# Patient Record
Sex: FEMALE | Race: WHITE | Employment: OTHER | URBAN - METROPOLITAN AREA
[De-identification: names, ages, dates, MRNs, and addresses within clinical notes are randomized per-mention and may not be internally consistent; named-entity substitution may affect disease eponyms.]

---

## 2022-01-15 ENCOUNTER — HOSPITAL ENCOUNTER (EMERGENCY)
Age: 52
Discharge: HOME | End: 2022-01-15
Payer: MEDICARE

## 2022-01-15 VITALS
OXYGEN SATURATION: 96 % | HEART RATE: 95 BPM | DIASTOLIC BLOOD PRESSURE: 81 MMHG | WEIGHT: 140 LBS | RESPIRATION RATE: 16 BRPM | BODY MASS INDEX: 25.61 KG/M2 | SYSTOLIC BLOOD PRESSURE: 148 MMHG | TEMPERATURE: 97.7 F

## 2022-01-15 DIAGNOSIS — N39.0 URINARY TRACT INFECTION WITH HEMATURIA, SITE UNSPECIFIED: Primary | ICD-10-CM

## 2022-01-15 DIAGNOSIS — R31.9 URINARY TRACT INFECTION WITH HEMATURIA, SITE UNSPECIFIED: Primary | ICD-10-CM

## 2022-01-15 LAB
BACTERIA: ABNORMAL /HPF
BILIRUBIN URINE: NEGATIVE
BLOOD, URINE: ABNORMAL
CLARITY: ABNORMAL
COLOR: YELLOW
GLUCOSE URINE: NEGATIVE MG/DL
KETONES, URINE: NEGATIVE MG/DL
LEUKOCYTE ESTERASE, URINE: ABNORMAL
NITRITE, URINE: NEGATIVE
PH UA: 6 (ref 5–9)
PROTEIN UA: 30 MG/DL
RBC UA: >20 /HPF (ref 0–2)
SPECIFIC GRAVITY UA: 1.02 (ref 1–1.03)
UROBILINOGEN, URINE: 0.2 E.U./DL
WBC UA: >20 /HPF (ref 0–5)

## 2022-01-15 PROCEDURE — 81001 URINALYSIS AUTO W/SCOPE: CPT

## 2022-01-15 PROCEDURE — 87088 URINE BACTERIA CULTURE: CPT

## 2022-01-15 PROCEDURE — 87077 CULTURE AEROBIC IDENTIFY: CPT

## 2022-01-15 PROCEDURE — 99211 OFF/OP EST MAY X REQ PHY/QHP: CPT

## 2022-01-15 PROCEDURE — 87186 SC STD MICRODIL/AGAR DIL: CPT

## 2022-01-15 RX ORDER — HYDROCODONE BITARTRATE AND ACETAMINOPHEN 10; 325 MG/1; MG/1
1 TABLET ORAL EVERY 6 HOURS PRN
COMMUNITY

## 2022-01-15 RX ORDER — CEPHALEXIN 500 MG/1
500 CAPSULE ORAL 3 TIMES DAILY
Qty: 21 CAPSULE | Refills: 0 | Status: SHIPPED | OUTPATIENT
Start: 2022-01-15 | End: 2022-01-22

## 2022-01-15 NOTE — ED PROVIDER NOTES
Supple. Respiratory:  Lungs Clear to auscultation and breath sounds equal.  CV:  Regular rate and rhythm, normal heart sounds, without pathological murmurs, ectopy, gallops, or rubs. GI: No tenderness    Back: CVA Tenderness: No CVA tenderness. Integument: .  Warm, dry, without visible rash, unless noted elsewhere. Lymphatics: No lymphangitis or adenopathy noted. Neurological:  Oriented. Motor functions intact. Lab / Imaging Results   (All laboratory and radiology results have been personally reviewed by myself)  Labs:  Results for orders placed or performed during the hospital encounter of 01/15/22   Urinalysis   Result Value Ref Range    Color, UA Yellow Straw/Yellow    Clarity, UA CLOUDY (A) Clear    Glucose, Ur Negative Negative mg/dL    Bilirubin Urine Negative Negative    Ketones, Urine Negative Negative mg/dL    Specific Gravity, UA 1.025 1.005 - 1.030    Blood, Urine LARGE (A) Negative    pH, UA 6.0 5.0 - 9.0    Protein, UA 30 (A) Negative mg/dL    Urobilinogen, Urine 0.2 <2.0 E.U./dL    Nitrite, Urine Negative Negative    Leukocyte Esterase, Urine MODERATE (A) Negative   Microscopic Urinalysis   Result Value Ref Range    WBC, UA >20 (A) 0 - 5 /HPF    RBC, UA >20 0 - 2 /HPF    Bacteria, UA MODERATE (A) None Seen /HPF       Imaging: All Radiology results interpreted by Radiologist unless otherwise noted. No orders to display     ED Course / Medical Decision Making   Medications - No data to display       Consults:   None    Medical Decision Making:    Patient is well appearing, non toxic and appropriate for outpatient management. Plan is for symptom management and PCP follow up. She is positive for UTI she does have microscopic blood in her urine. I did start her on Keflex. Advised her to drink plenty of fluids. Advised her to follow-up with her doctor when she is treated to be sure the microscopic blood is cleared if not she will need further testing        Assessment      1.  Urinary tract infection with hematuria, site unspecified      Plan   Disposition: Discharge to home and advised to contact Char Plascencia MD  [address]  [phone]    Schedule an appointment as soon as possible for a visit      Patient condition is good    New Medications     New Prescriptions    CEPHALEXIN (KEFLEX) 500 MG CAPSULE    Take 1 capsule by mouth 3 times daily for 7 days     Electronically signed by MICHELLE Saldana CNP   DD: 1/15/22  **This report was transcribed using voice recognition software. Every effort was made to ensure accuracy; however, inadvertent computerized transcription errors may be present.   END OF ED PROVIDER NOTE           MICHELLE Saldana CNP  01/15/22 [id number]

## 2022-01-18 LAB
ORGANISM: ABNORMAL
URINE CULTURE, ROUTINE: ABNORMAL

## 2025-04-02 ENCOUNTER — TRANSCRIBE ORDERS (OUTPATIENT)
Dept: ADMINISTRATIVE | Age: 55
End: 2025-04-02

## 2025-04-02 DIAGNOSIS — M25.531 WRIST PAIN, ACUTE, RIGHT: Primary | ICD-10-CM

## 2025-04-24 ENCOUNTER — HOSPITAL ENCOUNTER (OUTPATIENT)
Dept: MRI IMAGING | Age: 55
Discharge: HOME | End: 2025-04-26
Attending: FAMILY MEDICINE
Payer: MEDICARE

## 2025-04-24 DIAGNOSIS — M25.531 WRIST PAIN, ACUTE, RIGHT: ICD-10-CM

## 2025-04-24 PROCEDURE — 70553 MRI BRAIN STEM W/O & W/DYE: CPT

## 2025-04-24 PROCEDURE — 6360000004 HC RX CONTRAST MEDICATION: Performed by: RADIOLOGY

## 2025-04-24 PROCEDURE — A9577 INJ MULTIHANCE: HCPCS | Performed by: RADIOLOGY

## 2025-04-24 PROCEDURE — 72156 MRI NECK SPINE W/O & W/DYE: CPT

## 2025-04-24 RX ADMIN — GADOBENATE DIMEGLUMINE 12 ML: 529 INJECTION, SOLUTION INTRAVENOUS at 19:10

## 2025-04-25 ENCOUNTER — OFFICE VISIT (OUTPATIENT)
Dept: NEUROSURGERY | Age: 55
End: 2025-04-25
Payer: MEDICARE

## 2025-04-25 VITALS
WEIGHT: 130 LBS | DIASTOLIC BLOOD PRESSURE: 84 MMHG | HEART RATE: 80 BPM | HEIGHT: 62 IN | RESPIRATION RATE: 18 BRPM | SYSTOLIC BLOOD PRESSURE: 146 MMHG | OXYGEN SATURATION: 92 % | BODY MASS INDEX: 23.92 KG/M2

## 2025-04-25 DIAGNOSIS — M43.8X2 ROTATIONAL DEFORMITY OF CERVICAL SPINE: Primary | ICD-10-CM

## 2025-04-25 PROCEDURE — 99204 OFFICE O/P NEW MOD 45 MIN: CPT | Performed by: NEUROLOGICAL SURGERY

## 2025-04-25 PROCEDURE — G8427 DOCREV CUR MEDS BY ELIG CLIN: HCPCS | Performed by: NEUROLOGICAL SURGERY

## 2025-04-25 PROCEDURE — 4004F PT TOBACCO SCREEN RCVD TLK: CPT | Performed by: NEUROLOGICAL SURGERY

## 2025-04-25 PROCEDURE — 3017F COLORECTAL CA SCREEN DOC REV: CPT | Performed by: NEUROLOGICAL SURGERY

## 2025-04-25 PROCEDURE — G8420 CALC BMI NORM PARAMETERS: HCPCS | Performed by: NEUROLOGICAL SURGERY

## 2025-04-25 RX ORDER — CYCLOBENZAPRINE HCL 10 MG
TABLET ORAL
COMMUNITY

## 2025-04-25 RX ORDER — HYDROXYZINE HYDROCHLORIDE 10 MG/1
10 TABLET, FILM COATED ORAL NIGHTLY
COMMUNITY
Start: 2025-04-08

## 2025-04-25 RX ORDER — ACETAMINOPHEN AND CODEINE PHOSPHATE 300; 60 MG/1; MG/1
TABLET ORAL
COMMUNITY

## 2025-04-25 RX ORDER — GABAPENTIN 300 MG/1
300 CAPSULE ORAL 3 TIMES DAILY
COMMUNITY
Start: 2025-03-19

## 2025-04-25 ASSESSMENT — ENCOUNTER SYMPTOMS
TROUBLE SWALLOWING: 0
SHORTNESS OF BREATH: 0
ABDOMINAL PAIN: 0
PHOTOPHOBIA: 0

## 2025-04-25 NOTE — PROGRESS NOTES
right wrist drop but is improving with conservative treatment.  On today's examination patient's right wrist flexion is 3 out of 5, finger extension 3- out of 5.  Patient has good has good strength in bilateral deltoid, biceps, triceps muscles.  She is hyperreflexic.  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  Surgically patient will require front back reconstruction anteriorly C4-7, posteriorly C3-T2 for deformity correction and decompression of nerve roots.  The patient also is a smoker.  Patient informs me that at this point she is not ready for any surgical intervention, she would like to continue with therapy, I recommended linear traction.  I recommend smoking cessation.  Patient follow-up with me is as necessary.        Electronically signed by Nick Copeland MD on 4/25/2025 at 1:57 PM

## 2025-04-30 NOTE — Clinical Note
Olga Serrano                                                                [address] [facility] [address]         4/30/25     Dear Ms. Serrano:  MRN:[id number]    This message is regarding a referral that needs to be scheduled. We were unable to reach you by phone; however, your referral is available for review in [facility] under insurance in the drop down menu. We will be making further attempts to contact you to get this scheduled.       Thank you,  Deshaun [facility]

## 2025-05-02 DIAGNOSIS — M21.331 RIGHT WRIST DROP: Primary | ICD-10-CM

## 2025-05-14 ENCOUNTER — PROCEDURE VISIT (OUTPATIENT)
Dept: PHYSICAL MEDICINE AND REHAB | Age: 55
End: 2025-05-14

## 2025-05-14 VITALS — BODY MASS INDEX: 23.92 KG/M2 | HEIGHT: 62 IN | WEIGHT: 130 LBS

## 2025-05-14 DIAGNOSIS — M21.331 RIGHT WRIST DROP: Primary | ICD-10-CM

## 2025-05-14 NOTE — PROGRESS NOTES
[facility]  Electrodiagnostic Laboratory  *Accredited by the [facility] with exemplary status  [address] [place] [address]  Phone: [phone]  Fax: [phone]    Referring Provider: Mora Landry MD  Primary Care Physician: Mora Landry MD  Patient Name: Olga Serrano  Patient YOB: 1970  Gender: female  BMI: Body mass index is 23.78 kg/m².  Height 1.575 m (5' 2\"), weight 59 kg (130 lb), last menstrual period 05/14/2014.    5/14/2025    Reason for Referral: Wrist drop    Description of clinical problem:   Chief Complaint   Patient presents with    Extremity Pain     Pain in the right wrist into the arm and neck. Symp since March 2025, no injury noted. Woke up and hand was paralyzed.     Numbness     Numbness in the pinky and thumb. Tingling at times, that is random spots.     Extremity Weakness     Recently, strength in the wrist and arm has improved due to going to PT.     Sensory NCS      Nerve / Sites Rec. Site Peak Lat PP Amp Segments Distance Velocity Temp.     ms µV  cm m/s °C   R Median - Digit II (Antidromic)      Palm Dig II 1.77 23.5 Palm - Dig II 7 64 32      Wrist Dig II 3.65 21.7 Wrist - Dig II 14 51 32   R Ulnar - Digit V (Antidromic)      Wrist Dig V 3.59 31.5 Wrist - Dig V 14 52 32.2   R Radial - Anatomical snuff box (Forearm)      Forearm Wrist 2.29 34.5 Forearm - Wrist 10 56 32.4   L Radial - Anatomical snuff box (Forearm)      Forearm Wrist 2.60 24.4 Forearm - Wrist 10 55 32       Motor NCS      Nerve / Sites Muscle Onset Amplitude Segments Distance Velocity Temp.     ms mV  cm m/s °C   R Median - APB      Palm APB 2.03 10.0 Palm - APB   32      Wrist APB 3.28 7.9 Wrist - Palm 8 64 32      Elbow APB 6.46 7.2 Elbow - Wrist 17 54 32   R Ulnar - ADM      Wrist ADM 2.81 12.2 Wrist - ADM 8  32      B.Elbow ADM 5.31 12.1 B.Elbow - Wrist 15 60 32      A.Elbow ADM 6.93 10.5 A.Elbow - B.Elbow 10 62 32   R Radial - EIP      Forearm EIP 2.03 4.0 Forearm

## 2025-05-14 NOTE — PROGRESS NOTES
Electrodiagnostic Laboratory  *Accredited by the [facility] with exemplary status  [address] [place] [address]  Phone: [phone]  Fax: [phone]      Date of Examination: 05/14/25    Patient Name: Olga Serrano  Independent historian was not needed    Chief Complaint   Patient presents with    Extremity Pain     Pain in the right wrist into the arm and neck. Symp since March 2025, no injury noted. Woke up and hand was paralyzed.     Numbness     Numbness in the pinky and thumb. Tingling at times, that is random spots.     Extremity Weakness     Recently, strength in the wrist and arm has improved due to going to PT.       History of Present Illness  The patient is a 54-year-old female who presents for evaluation of right upper extremity weakness due to wrist drop.    She was referred by her family physician, Dr. Landry. Approximately 2 months ago, she experienced an episode of complete paralysis in her hand upon awakening. Although she has regained some mobility, she continues to experience limitations in her daily activities. She reports a gradual improvement in her condition over the past 2 months. She also mentions that she had driven for approximately 10 hours the day prior to the onset of her symptoms.         Results    Records reviewed: I have reviewed the referring provider's office note.    There is not a family history of neuromuscular conditions.     Physical Exam:   Physical Exam  Negative Tinel. Negative Spurling.   Otherwise, there is no joint effusion, deformity, instability, swelling, erythema or warmth.  AROM is full in the spine and extremities.   Right wrist extension is 4/5, right finger extension is 4/5, otherwise, no focal sensorimotor deficit.  Reflexes 2+ and symmetric. Gait is normal.    Impression:     1. Right wrist drop [M21.331]        Assessment & Plan  1. Radial neuropathy.  The clinical presentation suggests radial nerve palsy, possibly due to an

## 2025-05-14 NOTE — PATIENT INSTRUCTIONS
Electrodiagnotic Laboratory  [facility] with [facility]  GOVIND Leonard D.O.   [facility]  [address] [place] [address]  Phone: [phone]  Fax: [phone]        Today you had an electrodiagnostic exam which included nerve conduction studies (NCS) and electromyography (EMG). This test evaluated the electrical activity of your nerves and muscles to help determine if you have a nerve or muscle disease.  This test can help determine the location and type of a nerve or muscle problem. This will help your referring doctor diagnose your condition and determine the appropriate next step in your treatment plan.     After your test:    1. There are no long lasting side effects of the test.     2. You may resume your normal activities without restrictions.     3.  Resume any medications that were stopped for the test.     4  If you have sore areas or bruising in your muscles where the needle was placed, apply a cold pack to the sore area for 15-20 minutes three to four times a day as needed for pain.  The soreness should go away in about 1-2 days.     5. Your results were provided  Briefly at the end of your test and the final detailed report will be provided to your referring physician, and/or primary care physician and any other parties you requested within 1-2 days of the examination. You may wish to contact your referring provider after a few days to determine what they would like you to do next.     6.  Please call [phone] with any questions or concerns and if you develop increased body temperature/fever, swelling, tenderness, increased pain and/or drainage from the sites where the needle was placed.     Thank you for choosing us for your health care needs.

## 2025-05-15 DIAGNOSIS — M21.331 RIGHT WRIST DROP: ICD-10-CM
